# Patient Record
Sex: FEMALE | Race: WHITE | NOT HISPANIC OR LATINO | URBAN - METROPOLITAN AREA
[De-identification: names, ages, dates, MRNs, and addresses within clinical notes are randomized per-mention and may not be internally consistent; named-entity substitution may affect disease eponyms.]

---

## 2019-06-05 ENCOUNTER — OFFICE VISIT (OUTPATIENT)
Dept: ENDOCRINOLOGY | Facility: CLINIC | Age: 74
End: 2019-06-05
Payer: MEDICARE

## 2019-06-05 VITALS
HEART RATE: 88 BPM | DIASTOLIC BLOOD PRESSURE: 88 MMHG | HEIGHT: 66 IN | WEIGHT: 206.4 LBS | SYSTOLIC BLOOD PRESSURE: 118 MMHG | BODY MASS INDEX: 33.17 KG/M2

## 2019-06-05 DIAGNOSIS — E78.5 DYSLIPIDEMIA: ICD-10-CM

## 2019-06-05 DIAGNOSIS — E11.8 TYPE 2 DIABETES MELLITUS WITH COMPLICATION, WITHOUT LONG-TERM CURRENT USE OF INSULIN (CMS/HCC): Primary | ICD-10-CM

## 2019-06-05 DIAGNOSIS — F90.9 ATTENTION DEFICIT HYPERACTIVITY DISORDER (ADHD), UNSPECIFIED ADHD TYPE: ICD-10-CM

## 2019-06-05 DIAGNOSIS — I87.2 CHRONIC VENOUS INSUFFICIENCY: ICD-10-CM

## 2019-06-05 DIAGNOSIS — G47.00 INSOMNIA, UNSPECIFIED TYPE: ICD-10-CM

## 2019-06-05 PROBLEM — M79.7 FIBROMYALGIA: Status: ACTIVE | Noted: 2018-06-04

## 2019-06-05 PROBLEM — F41.9 ANXIETY: Status: ACTIVE | Noted: 2018-06-04

## 2019-06-05 PROCEDURE — 99204 OFFICE O/P NEW MOD 45 MIN: CPT | Performed by: INTERNAL MEDICINE

## 2019-06-05 RX ORDER — LISDEXAMFETAMINE DIMESYLATE 20 MG/1
CAPSULE ORAL
COMMUNITY
Start: 2018-08-27

## 2019-06-05 RX ORDER — DEXTROAMPHETAMINE SACCHARATE, AMPHETAMINE ASPARTATE, DEXTROAMPHETAMINE SULFATE AND AMPHETAMINE SULFATE 1.25; 1.25; 1.25; 1.25 MG/1; MG/1; MG/1; MG/1
5 TABLET ORAL
COMMUNITY

## 2019-06-05 RX ORDER — EZETIMIBE 10 MG/1
TABLET ORAL
COMMUNITY
Start: 2019-04-18

## 2019-06-05 RX ORDER — VALACYCLOVIR HYDROCHLORIDE 1 G/1
1000 TABLET, FILM COATED ORAL
COMMUNITY

## 2019-06-05 RX ORDER — CANAGLIFLOZIN 100 MG/1
TABLET, FILM COATED ORAL
COMMUNITY
Start: 2019-05-29

## 2019-06-05 RX ORDER — FLUOCINONIDE 0.5 MG/G
OINTMENT TOPICAL
COMMUNITY

## 2019-06-05 RX ORDER — CLOTRIMAZOLE AND BETAMETHASONE DIPROPIONATE 10; .64 MG/G; MG/G
CREAM TOPICAL
COMMUNITY

## 2019-06-05 RX ORDER — ROSUVASTATIN CALCIUM 10 MG/1
TABLET, COATED ORAL
COMMUNITY
Start: 2018-08-05

## 2019-06-05 RX ORDER — CELECOXIB 200 MG/1
CAPSULE ORAL
COMMUNITY
Start: 2019-04-18

## 2019-06-05 RX ORDER — BLOOD-GLUCOSE METER
EACH MISCELLANEOUS
Qty: 100 STRIP | Refills: 3 | Status: SHIPPED | OUTPATIENT
Start: 2019-06-05

## 2019-06-05 RX ORDER — BUPROPION HYDROCHLORIDE 300 MG/1
TABLET ORAL
COMMUNITY
Start: 2018-09-26

## 2019-06-05 NOTE — PATIENT INSTRUCTIONS
1. Your labs on 5/23/19  Blood sugar 95  A1C 5.5  Both normal    2. Start checking your blood sugars  Check your blood sugars once a day, but vary the times  Goal for blood sugars:  Fasting 80 to 120  2 hours after different meals below 140    3. Stop the Invokana for now    4. Let me know if your sugars start to go up    5. This is the best time to work on your diet    6. Home sleep study    6. Diabetes classes at [facility]    7. Exercise is very important: for cardiovascular health and for the neuropathy

## 2019-06-05 NOTE — PROGRESS NOTES
Endocrinology Consultation Report     Patient Name: Juanita Johnston  MR#: [id number]  : 1945  Consultant: Charity Downs MD      Juanita Johnston is a 74 y.o. female who presents for evaluation for diabetes and hair loss    HPI:  Diagnosed with diabetes in 2016 in [place], they gave her metformin, but it caused diarrhea and she stopped it around a year ago, Dr. Lucia started Invokana 100 mg daily. She does not check her blood sugars, she was not referred to diabetes classes. No polyuria or polydipsia. She has blurred vision occasionally.     She is on vyvanse for chronic fatigue syndrome, she also takes adderall as needed for the fatigue.  she takes celebrex for fibromyalgia.    She is exhausted recently, she recently moved, her 4th move in 6 years, she goes back and forth between [facility] and [place]. She cares for her brother there and she has house there.   She gained 25 lbs since . She gained around 5 or 6 lbs in past 3 months. She is under a lot of stress.   She lost 20 lbs a year ago, but she regained it.   She has history of swollen ankles, worse with heat and traveling. she saw cardiology in [place], extensive work up was done. He recommended support hose.     Hair loss for 6 months, more in temporal area.  No increased facial hair or acne. She lost most of her eyebrows.     She was diagnosed with neuropathy around 2 to 3 years ago. Gets a pulling sensation in her heels on and off, once every 7 to 10 days. She saw neurology in [place] and had ?NCS done    She was told she needs to get a sleep study.     PMH/PSH  Anxiety disorder  Depression   Fibromyalgia (lat )  Chronic fatigue (late )  Hyperlipidemia  Hair loss  Extensive endometriosis, surgery twice, hysterectomy, ovariectomy age 38  Premarin for many years  Breast pre-cancer, right lumpectomy, no radiation  Has been on Elestrin for many years (estradiol gel)    FH  No FH of diabetes or thyroid problems  Her father had a  heart attack    SOCHX   2011  No kids  Psychotherapy for 15 years, worked in [employer] for years    Medical History:  Past Medical History:   Diagnosis Date   • ADD (attention deficit disorder)    • Fibromyalgia    • Type 2 diabetes mellitus (CMS/HCC) (HCC)        Surgical History:   Past Surgical History:   Procedure Laterality Date   • BREAST SURGERY     • CHOLECYSTECTOMY     • HYSTERECTOMY      2 partial    • SKIN CANCER EXCISION      facial x 3       Family History:  Family History   Problem Relation Age of Onset   • Heart disease Father         Social history:  Social History     Social History   • Marital status: Unknown     Spouse name: N/A   • Number of children: N/A   • Years of education: N/A     Occupational History   • Not on file.     Social History Main Topics   • Smoking status: Former Smoker   • Smokeless tobacco: Never Used   • Alcohol use Yes      Comment: rarely   • Drug use: No   • Sexual activity: Not on file     Other Topics Concern   • Not on file     Social History Narrative   • No narrative on file       Medication(active prior to today):  Current Outpatient Prescriptions   Medication Sig Dispense Refill   • buPROPion XL (WELLBUTRIN XL) 300 mg 24 hr tablet TAKE 1 TABLET BY MOUTH EVERY DAY IN THE MORNING     • lisdexamfetamine (VYVANSE) 20 mg capsule TAKE ONE CAPSULE BY MOUTH IN THE MORNING     • rosuvastatin (CRESTOR) 10 mg tablet TAKE 1 TABLET BY MOUTH EVERY DAY     • celecoxib (celeBREX) 200 mg capsule      • clotrimazole-betamethasone (LOTRISONE) 1-0.05 % cream Apply topically.     • dextroamphetamine-amphetamine (ADDERALL) 5 mg tablet Take 5 mg by mouth.     • estradiol (ELESTRIN) 0.87 gram/actuation gel in metered-dose pump Elestrin 0.87 gram/actuation (0.06%) transdermal gel pump     • ezetimibe (ZETIA) 10 mg tablet      • fluocinonide (LIDEX) 0.05 % ointment fluocinonide 0.05 % topical ointment     • INVOKANA 100 mg tablet      • ONETOUCH DELICA LANCETS 33 gauge misc Test once  "a day. DX E11.9 100 each 3   • ONETOUCH VERIO strip Test once a day. DX E11.9 100 strip 3   • valACYclovir (VALTREX) 1 gram tablet Take 1,000 mg by mouth.       No current facility-administered medications for this visit.        Allergies:  Demerol [meperidine]    Review of Systems  All other systems reviewed and negative except as noted in HPI    Vitals:    06/05/19 1300   BP: 118/88   BP Location: Left upper arm   Patient Position: Sitting   Pulse: 88   Weight: 93.6 kg (206 lb 6.4 oz)   Height: 1.664 m (5' 5.5\")     Body mass index is 33.82 kg/m².    Physical Exam   Constitutional: She is oriented to person, place, and time. She appears well-developed.   Generalized obesity   HENT:   Head: Normocephalic and atraumatic.   Eyes: Pupils are equal, round, and reactive to light. Conjunctivae and EOM are normal.   Neck: No tracheal deviation present. No thyromegaly present.   Cardiovascular: Normal rate, regular rhythm and normal heart sounds.    Pulmonary/Chest: Effort normal and breath sounds normal.   Abdominal: Soft. Bowel sounds are normal. She exhibits no distension. There is no tenderness.   Musculoskeletal: She exhibits no edema.   Foot exam: no open wounds or deformity. Monofilament exam is normal.    Lymphadenopathy:     She has no cervical adenopathy.   Neurological: She is alert and oriented to person, place, and time. She displays normal reflexes.   Skin: No rash noted.   Psychiatric: She has a normal mood and affect.       No results found for: HGBA1C, CREATININE, K  No results found for: TSH, T3FREE, FREET4, TRIG, LDL, CHOL, LDLDIRECT, ALT        LABS:  5/23/2019  25 vitamin D 32.6  TSH 3.15  BG 95  Calcium 10.4  Sodium 142  BUN 17  Creatinine 0.9  AST 23  ALT 19  A1c 5.5  Hemoglobin 15.9  Cholesterol 161  HDL 58  TG 90  LDL 85  Non-    Assessment/Plan   Type 2 diabetes/prediabetes  Diagnosed in 2016  She did not tolerate metformin  Her A1C is 5.5 on Invokana  She will contineu working on her " diet  Stop Invokana  Monitor glucose levels closely      Neuropathy  Not clear if related to her diabetes  Foot care  Exercise    Hair loss  Check thyroid levels    Fatigue  She is a candidate for sleep study    Chronic venous insufficiency  She was evaluated by cardiology    Dyslipidemia  Continue Crestor    Problem List Items Addressed This Visit     Attention deficit hyperactivity disorder    Relevant Medications    dextroamphetamine-amphetamine (ADDERALL) 5 mg tablet    lisdexamfetamine (VYVANSE) 20 mg capsule    buPROPion XL (WELLBUTRIN XL) 300 mg 24 hr tablet    Other Relevant Orders    Comprehensive metabolic panel    Chronic venous insufficiency    Overview     Overview:   BILATERAL         Relevant Orders    Comprehensive metabolic panel    CBC and differential    Dyslipidemia    Relevant Orders    Comprehensive metabolic panel    Lipid panel    TSH 3rd Generation    T4, free    CBC and differential    Insomnia    Relevant Orders    Comprehensive metabolic panel    Type 2 diabetes mellitus with complication, without long-term current use of insulin (CMS/[facility]) - Primary    Relevant Medications    INVOKANA 100 mg tablet    Other Relevant Orders    Comprehensive metabolic panel    Hemoglobin A1c    Lipid panel    TSH 3rd Generation    T4, free    Thyroid peroxidase antibody    CBC and differential            Patient Instructions   1. Your labs on 5/23/19  Blood sugar 95  A1C 5.5  Both normal    2. Start checking your blood sugars  Check your blood sugars once a day, but vary the times  Goal for blood sugars:  Fasting 80 to 120  2 hours after different meals below 140    3. Stop the Invokana for now    4. Let me know if your sugars start to go up    5. This is the best time to work on your diet    6. Home sleep study    6. Diabetes classes at [facility]    7. Exercise is very important: for cardiovascular health and for the neuropathy          Charity Downs MD

## 2019-06-20 ENCOUNTER — TRANSCRIBE ORDERS (OUTPATIENT)
Dept: SCHEDULING | Age: 74
End: 2019-06-20

## 2019-06-20 DIAGNOSIS — G47.33 OBSTRUCTIVE SLEEP APNEA: Primary | ICD-10-CM

## 2019-07-01 ENCOUNTER — HOSPITAL ENCOUNTER (OUTPATIENT)
Dept: SLEEP MEDICINE | Facility: HOSPITAL | Age: 74
Discharge: HOME | End: 2019-07-01
Attending: INTERNAL MEDICINE
Payer: MEDICARE

## 2019-07-01 DIAGNOSIS — G47.33 OBSTRUCTIVE SLEEP APNEA: ICD-10-CM

## 2019-07-01 PROCEDURE — G0399 HOME SLEEP TEST/TYPE 3 PORTA: HCPCS

## 2019-07-18 NOTE — PROGRESS NOTES
Sleep Study Note    Juanita Johnston is a 74 y.o. female    There were no vitals filed for this visit.    Orders Placed This Encounter   Procedures   • Home sleep test       Night 1  Study signal quality:  AHI:  DELMAR SAO2:  Additional comments:    Night 2 (if applicable)  AHI:  Delmar SAO2:  Additional comments:    Darryl Lange  07/18/19 1:00 PM

## 2019-07-18 NOTE — PROGRESS NOTES
Sleep Study Note    Juanita Johnston is a 74 y.o. female    There were no vitals filed for this visit.    Orders Placed This Encounter   Procedures   • Home sleep test       Night 1  7-7-2019  Study signal quality:poor  AHI:1.8  DELMAR SAO2:82.0%  Additional comments:    Night 2 (if applicable) only did 1 recording  AHI:  Delmar SAO2:  Additional comments:    Darryl Lange  07/18/19 1:00 PM

## 2021-06-03 ENCOUNTER — APPOINTMENT (RX ONLY)
Dept: URBAN - METROPOLITAN AREA CLINIC 28 | Facility: CLINIC | Age: 76
Setting detail: DERMATOLOGY
End: 2021-06-03

## 2021-06-03 DIAGNOSIS — D22 MELANOCYTIC NEVI: ICD-10-CM

## 2021-06-03 DIAGNOSIS — L82.1 OTHER SEBORRHEIC KERATOSIS: ICD-10-CM

## 2021-06-03 DIAGNOSIS — L81.4 OTHER MELANIN HYPERPIGMENTATION: ICD-10-CM

## 2021-06-03 DIAGNOSIS — L57.0 ACTINIC KERATOSIS: ICD-10-CM

## 2021-06-03 DIAGNOSIS — D18.0 HEMANGIOMA: ICD-10-CM

## 2021-06-03 DIAGNOSIS — L56.5 DISSEMINATED SUPERFICIAL ACTINIC POROKERATOSIS (DSAP): ICD-10-CM

## 2021-06-03 DIAGNOSIS — Z71.89 OTHER SPECIFIED COUNSELING: ICD-10-CM

## 2021-06-03 PROBLEM — D48.5 NEOPLASM OF UNCERTAIN BEHAVIOR OF SKIN: Status: ACTIVE | Noted: 2021-06-03

## 2021-06-03 PROBLEM — D22.61 MELANOCYTIC NEVI OF RIGHT UPPER LIMB, INCLUDING SHOULDER: Status: ACTIVE | Noted: 2021-06-03

## 2021-06-03 PROBLEM — D22.5 MELANOCYTIC NEVI OF TRUNK: Status: ACTIVE | Noted: 2021-06-03

## 2021-06-03 PROBLEM — D18.01 HEMANGIOMA OF SKIN AND SUBCUTANEOUS TISSUE: Status: ACTIVE | Noted: 2021-06-03

## 2021-06-03 PROBLEM — D22.62 MELANOCYTIC NEVI OF LEFT UPPER LIMB, INCLUDING SHOULDER: Status: ACTIVE | Noted: 2021-06-03

## 2021-06-03 PROBLEM — D22.72 MELANOCYTIC NEVI OF LEFT LOWER LIMB, INCLUDING HIP: Status: ACTIVE | Noted: 2021-06-03

## 2021-06-03 PROBLEM — D22.71 MELANOCYTIC NEVI OF RIGHT LOWER LIMB, INCLUDING HIP: Status: ACTIVE | Noted: 2021-06-03

## 2021-06-03 PROCEDURE — ? PHOTO-DOCUMENTATION

## 2021-06-03 PROCEDURE — 99203 OFFICE O/P NEW LOW 30 MIN: CPT | Mod: 25

## 2021-06-03 PROCEDURE — ? BENIGN DESTRUCTION

## 2021-06-03 PROCEDURE — 17000 DESTRUCT PREMALG LESION: CPT | Mod: 59

## 2021-06-03 PROCEDURE — ? SUNSCREEN RECOMMENDATIONS

## 2021-06-03 PROCEDURE — ? LIQUID NITROGEN

## 2021-06-03 PROCEDURE — 11102 TANGNTL BX SKIN SINGLE LES: CPT | Mod: 59

## 2021-06-03 PROCEDURE — ? FULL BODY SKIN EXAM

## 2021-06-03 PROCEDURE — ? BIOPSY BY SHAVE METHOD

## 2021-06-03 PROCEDURE — 17110 DESTRUCTION B9 LES UP TO 14: CPT

## 2021-06-03 PROCEDURE — ? COUNSELING

## 2021-06-03 ASSESSMENT — LOCATION DETAILED DESCRIPTION DERM
LOCATION DETAILED: INFERIOR THORACIC SPINE
LOCATION DETAILED: RIGHT MEDIAL SUPERIOR CHEST
LOCATION DETAILED: LEFT ANTERIOR PROXIMAL THIGH
LOCATION DETAILED: LOWER STERNUM
LOCATION DETAILED: EPIGASTRIC SKIN
LOCATION DETAILED: RIGHT ANTERIOR PROXIMAL UPPER ARM
LOCATION DETAILED: SUPERIOR THORACIC SPINE
LOCATION DETAILED: LEFT ANTERIOR DISTAL THIGH
LOCATION DETAILED: LEFT ANTERIOR PROXIMAL UPPER ARM
LOCATION DETAILED: RIGHT PROXIMAL PRETIBIAL REGION
LOCATION DETAILED: LEFT MEDIAL UPPER BACK
LOCATION DETAILED: LEFT CENTRAL EYEBROW
LOCATION DETAILED: RIGHT ANTERIOR DISTAL THIGH
LOCATION DETAILED: RIGHT ANTERIOR DISTAL UPPER ARM
LOCATION DETAILED: RIGHT SUPERIOR MEDIAL UPPER BACK

## 2021-06-03 ASSESSMENT — LOCATION ZONE DERM
LOCATION ZONE: LEG
LOCATION ZONE: TRUNK
LOCATION ZONE: FACE
LOCATION ZONE: ARM

## 2021-06-03 ASSESSMENT — LOCATION SIMPLE DESCRIPTION DERM
LOCATION SIMPLE: LEFT THIGH
LOCATION SIMPLE: CHEST
LOCATION SIMPLE: LEFT EYEBROW
LOCATION SIMPLE: RIGHT UPPER ARM
LOCATION SIMPLE: RIGHT UPPER BACK
LOCATION SIMPLE: RIGHT THIGH
LOCATION SIMPLE: ABDOMEN
LOCATION SIMPLE: LEFT UPPER BACK
LOCATION SIMPLE: RIGHT PRETIBIAL REGION
LOCATION SIMPLE: UPPER BACK
LOCATION SIMPLE: LEFT UPPER ARM

## 2021-06-03 NOTE — PROCEDURE: LIQUID NITROGEN
Post-Care Instructions: I reviewed with the patient in detail post-care instructions. Patient is to wear sunprotection, and avoid picking at any of the treated lesions. Pt may apply Vaseline to crusted or scabbing areas.
Number Of Freeze-Thaw Cycles: 1 freeze-thaw cycle
Consent: The patient's consent was obtained including but not limited to risks of crusting, scabbing, blistering, scarring, darker or lighter pigmentary change, recurrence, incomplete removal and infection.
Render Post-Care Instructions In Note?: no
Detail Level: Detailed
Duration Of Freeze Thaw-Cycle (Seconds): 0

## 2021-06-03 NOTE — PROCEDURE: BIOPSY BY SHAVE METHOD
Detail Level: Detailed
Depth Of Biopsy: dermis
Was A Bandage Applied: Yes
Size Of Lesion In Cm: 0
Biopsy Type: H and E
Biopsy Method: Dermablade
Anesthesia Type: 1% lidocaine without epinephrine
Anesthesia Volume In Cc (Will Not Render If 0): 0.3
Hemostasis: Electrocautery and Aluminum Chloride
Wound Care: Petrolatum
Dressing: bandage
Destruction After The Procedure: No
Type Of Destruction Used: Curettage
Curettage Text: The wound bed was treated with curettage after the biopsy was performed.
Cryotherapy Text: The wound bed was treated with cryotherapy after the biopsy was performed.
Electrodesiccation Text: The wound bed was treated with electrodesiccation after the biopsy was performed.
Electrodesiccation And Curettage Text: The wound bed was treated with electrodesiccation and curettage after the biopsy was performed.
Silver Nitrate Text: The wound bed was treated with silver nitrate after the biopsy was performed.
Lab: 6
Consent: Written consent was obtained and risks were reviewed including but not limited to scarring, infection, bleeding, scabbing, incomplete removal, nerve damage and allergy to anesthesia.
Post-Care Instructions: I reviewed with the patient in detail post-care instructions. Patient is to keep the biopsy site dry overnight, and then apply bacitracin twice daily until healed. Patient may apply hydrogen peroxide soaks to remove any crusting.
Notification Instructions: Patient will be notified of biopsy results. However, patient instructed to call the office if not contacted within 2 weeks.
Billing Type: Third-Party Bill
Information: Selecting Yes will display possible errors in your note based on the variables you have selected. This validation is only offered as a suggestion for you. PLEASE NOTE THAT THE VALIDATION TEXT WILL BE REMOVED WHEN YOU FINALIZE YOUR NOTE. IF YOU WANT TO FAX A PRELIMINARY NOTE YOU WILL NEED TO TOGGLE THIS TO 'NO' IF YOU DO NOT WANT IT IN YOUR FAXED NOTE.

## 2021-06-03 NOTE — PROCEDURE: BENIGN DESTRUCTION
Add 52 Modifier (Optional): no
Consent: The patient's consent was obtained including but not limited to risks of crusting, scabbing, blistering, scarring, darker or lighter pigmentary change, recurrence, incomplete removal and infection.
Treatment Number (Will Not Render If 0): 1
Detail Level: Detailed
Medical Necessity Information: It is in your best interest to select a reason for this procedure from the list below. All of these items fulfill various CMS LCD requirements except the new and changing color options.
Post-Care Instructions: I reviewed with the patient in detail post-care instructions. Patient is to wear sunprotection, and avoid picking at any of the treated lesions. Pt may apply Vaseline to crusted or scabbing areas.
Medical Necessity Clause: This procedure was medically necessary because the lesions that were treated were: